# Patient Record
Sex: FEMALE
[De-identification: names, ages, dates, MRNs, and addresses within clinical notes are randomized per-mention and may not be internally consistent; named-entity substitution may affect disease eponyms.]

---

## 2017-06-27 ENCOUNTER — APPOINTMENT (OUTPATIENT)
Dept: UROGYNECOLOGY | Facility: CLINIC | Age: 47
End: 2017-06-27

## 2017-06-27 VITALS
BODY MASS INDEX: 19.51 KG/M2 | DIASTOLIC BLOOD PRESSURE: 70 MMHG | WEIGHT: 106 LBS | SYSTOLIC BLOOD PRESSURE: 122 MMHG | HEIGHT: 62 IN

## 2017-06-27 DIAGNOSIS — Z86.79 PERSONAL HISTORY OF OTHER DISEASES OF THE CIRCULATORY SYSTEM: ICD-10-CM

## 2017-06-27 DIAGNOSIS — Z83.49 FAMILY HISTORY OF OTHER ENDOCRINE, NUTRITIONAL AND METABOLIC DISEASES: ICD-10-CM

## 2017-06-27 DIAGNOSIS — Z78.9 OTHER SPECIFIED HEALTH STATUS: ICD-10-CM

## 2017-06-27 DIAGNOSIS — Z81.8 FAMILY HISTORY OF OTHER MENTAL AND BEHAVIORAL DISORDERS: ICD-10-CM

## 2017-06-27 DIAGNOSIS — Z82.5 FAMILY HISTORY OF ASTHMA AND OTHER CHRONIC LOWER RESPIRATORY DISEASES: ICD-10-CM

## 2017-06-27 DIAGNOSIS — Z83.3 FAMILY HISTORY OF DIABETES MELLITUS: ICD-10-CM

## 2017-07-31 ENCOUNTER — OUTPATIENT (OUTPATIENT)
Dept: OUTPATIENT SERVICES | Facility: HOSPITAL | Age: 47
LOS: 1 days | End: 2017-07-31
Payer: COMMERCIAL

## 2017-07-31 ENCOUNTER — APPOINTMENT (OUTPATIENT)
Dept: UROGYNECOLOGY | Facility: CLINIC | Age: 47
End: 2017-07-31
Payer: COMMERCIAL

## 2017-07-31 ENCOUNTER — RESULT REVIEW (OUTPATIENT)
Age: 47
End: 2017-07-31

## 2017-07-31 DIAGNOSIS — Z01.818 ENCOUNTER FOR OTHER PREPROCEDURAL EXAMINATION: ICD-10-CM

## 2017-07-31 DIAGNOSIS — R39.13 SPLITTING OF URINARY STREAM: ICD-10-CM

## 2017-07-31 DIAGNOSIS — R39.16 STRAINING TO VOID: ICD-10-CM

## 2017-07-31 LAB
BILIRUB UR QL STRIP: NORMAL
CLARITY UR: CLEAR
COLLECTION METHOD: NORMAL
GLUCOSE UR-MCNC: NORMAL
HCG UR QL: 0.2 EU/DL
HGB UR QL STRIP.AUTO: NORMAL
KETONES UR-MCNC: NORMAL
LEUKOCYTE ESTERASE UR QL STRIP: NORMAL
NITRITE UR QL STRIP: NORMAL
PH UR STRIP: 7
PROT UR STRIP-MCNC: NORMAL
SP GR UR STRIP: 1.01

## 2017-07-31 PROCEDURE — 51729 CYSTOMETROGRAM W/VP&UP: CPT | Mod: 26

## 2017-07-31 PROCEDURE — 51784 ANAL/URINARY MUSCLE STUDY: CPT | Mod: 26

## 2017-07-31 PROCEDURE — 51797 INTRAABDOMINAL PRESSURE TEST: CPT | Mod: 26

## 2017-08-01 DIAGNOSIS — R39.16 STRAINING TO VOID: ICD-10-CM

## 2017-08-01 DIAGNOSIS — R39.13 SPLITTING OF URINARY STREAM: ICD-10-CM

## 2017-08-01 DIAGNOSIS — N39.3 STRESS INCONTINENCE (FEMALE) (MALE): ICD-10-CM

## 2017-08-03 ENCOUNTER — APPOINTMENT (OUTPATIENT)
Dept: UROGYNECOLOGY | Facility: CLINIC | Age: 47
End: 2017-08-03

## 2017-08-10 ENCOUNTER — APPOINTMENT (OUTPATIENT)
Dept: UROGYNECOLOGY | Facility: CLINIC | Age: 47
End: 2017-08-10

## 2022-12-08 ENCOUNTER — NON-APPOINTMENT (OUTPATIENT)
Age: 52
End: 2022-12-08

## 2022-12-27 ENCOUNTER — APPOINTMENT (OUTPATIENT)
Dept: UROGYNECOLOGY | Facility: CLINIC | Age: 52
End: 2022-12-27

## 2022-12-27 VITALS
SYSTOLIC BLOOD PRESSURE: 123 MMHG | BODY MASS INDEX: 19.69 KG/M2 | HEIGHT: 62 IN | WEIGHT: 107 LBS | DIASTOLIC BLOOD PRESSURE: 79 MMHG

## 2022-12-27 DIAGNOSIS — Z83.511 FAMILY HISTORY OF GLAUCOMA: ICD-10-CM

## 2022-12-27 DIAGNOSIS — R35.0 FREQUENCY OF MICTURITION: ICD-10-CM

## 2022-12-27 DIAGNOSIS — Z82.49 FAMILY HISTORY OF ISCHEMIC HEART DISEASE AND OTHER DISEASES OF THE CIRCULATORY SYSTEM: ICD-10-CM

## 2022-12-27 DIAGNOSIS — Z83.42 FAMILY HISTORY OF FAMILIAL HYPERCHOLESTEROLEMIA: ICD-10-CM

## 2022-12-27 DIAGNOSIS — I34.1 NONRHEUMATIC MITRAL (VALVE) PROLAPSE: ICD-10-CM

## 2022-12-27 DIAGNOSIS — N39.3 STRESS INCONTINENCE (FEMALE) (MALE): ICD-10-CM

## 2022-12-27 DIAGNOSIS — R39.15 URGENCY OF URINATION: ICD-10-CM

## 2022-12-27 DIAGNOSIS — Z80.9 FAMILY HISTORY OF MALIGNANT NEOPLASM, UNSPECIFIED: ICD-10-CM

## 2022-12-27 LAB
BILIRUB UR QL STRIP: NORMAL
CLARITY UR: CLEAR
COLLECTION METHOD: NORMAL
GLUCOSE UR-MCNC: NORMAL
HCG UR QL: 0.2 EU/DL
HGB UR QL STRIP.AUTO: NORMAL
KETONES UR-MCNC: NORMAL
LEUKOCYTE ESTERASE UR QL STRIP: NORMAL
NITRITE UR QL STRIP: NORMAL
PH UR STRIP: 7.5
PROT UR STRIP-MCNC: NORMAL
SP GR UR STRIP: 1.02

## 2022-12-27 PROCEDURE — 51701 INSERT BLADDER CATHETER: CPT

## 2022-12-27 PROCEDURE — 99204 OFFICE O/P NEW MOD 45 MIN: CPT | Mod: 25

## 2022-12-27 RX ORDER — SOLIFENACIN SUCCINATE 5 MG/1
5 TABLET ORAL
Qty: 30 | Refills: 5 | Status: ACTIVE | COMMUNITY
Start: 2022-12-27 | End: 1900-01-01

## 2022-12-27 NOTE — HISTORY OF PRESENT ILLNESS
[Unable To Restrain Bowel Movement] : mild [] : years ago [Feelings Of Urinary Urgency] : severe [Hematuria] : severe [Urinary Tract Infection] : severe [Constipation Obstructed Defecation] : no [de-identified] : denies bother, reports this is occasional  [de-identified] : 1-2 [FreeTextEntry6] : underwent GI evaluattion that was normal as per patient [FreeTextEntry1] : \par Underwent urodynamic testing in 2017 which I reviewed. URD revealed RAFAEL. PVR 0 cc, however she was unable to void with catheters in place. \par \par daily fluid intake: 2 c coffee, 8-12 oz water

## 2022-12-27 NOTE — PROCEDURE
[Fluid Management] : fluid management [Bladder Training] : bladder training [FreeTextEntry1] : Sterile straight catheterization was performed to measure a postvoid residual volume which was 20 cc

## 2022-12-27 NOTE — REASON FOR VISIT
[Questionnaire Received] : Patient questionnaire received [Intake Form Reviewed] : Patient intake form with past medical history, surgical history, family history and social history reviewed today [Urine Frequency] : urine frequency [Urinary Incontinence] : urinary incontinence

## 2022-12-27 NOTE — DISCUSSION/SUMMARY
[FreeTextEntry1] : \par 1. Frequency, urgency: We discussed possible etiologies of her symptoms including overactive bladder. I recommend she start behavioral modifications and bladder training. Written instructions on how to perform bladder training were provided.  She will try this for 4-6 weeks. We reviewed medications for overactive bladder.  If she has no significant improvement in her symptoms she will try adding an anticholinergic medication. Risks and side effects reviewed extensively. She will RTO in 10-12 weeks for follow up or sooner if issues arise.\par  \par 2. RAFAEL: She denies bother, will continue to observe. If she develops bothersome symptoms, will notify me and proceed with treatment at that time. \par \par The following treatment plan was designed for this patient and provided to her in written form and reviewed extensively. Patient was given a copy to take home: \par For Urinary Symptoms (frequency, urgency, difficulty holding urine):\par **Total fluids: 34-50 oz (1-1.5 Liters) per day\par   -Ex. 8 oz coffee or tea (NOT BOTH!), 2-3 bottles of water (Each bottle is 16.9 oz). No flavoring added. No seltzer or Pelligrino!\par  -Drink slowly throughout day, no binge drinking (each bottle of water should take you hours, not minutes)\par **Avoid: 2nd cup coffee or tea (even if decaf), alcohol, carbonation (soda, seltzer), spicy foods, citrus, artificial sweeteners, chocolate\par **Stop eating and drinking 2-3 hours before bedtime (sips ok)\par \par -Try the above fluid changes and bladder training for 4 weeks. If symptoms still remain bothersome, add Vesicare (solifenacin) 5 mg daily. You must continue the fluid changes while on medication. Medication may take 3-4 weeks to start working. Common side effects include: dry mouth, dry eyes, constipation. If side effects try to manage first:\par Dry eyes: lubricating eye drops\par Dry mouth: biotene mouth wash\par Constipation: Miralax\par \par If not covered by insurance, call insurance company and get list of overactive bladder medications that are covered. Call my office with list (Mon-Fri) and we will change medication\par

## 2022-12-27 NOTE — PHYSICAL EXAM
[Chaperone Present] : A chaperone was present in the examining room during all aspects of the physical examination [Labia Majora] : were normal [Normal Appearance] : general appearance was normal [Normal] : no abnormalities [Exam Deferred] : was deferred [FreeTextEntry1] : General: Well, appearing. Alert and orientated. No acute distress\par HEENT: Normocephalic, atraumatic and extraocular muscles appear to be intact \par Neck: Full range of motion, no obvious lymphadenopathy, deformities, or masses noted \par Respiratory: Speaking in full sentences comfortably, normal work of breathing and no cough during visit\par Musculoskeletal: active full range of motion in extremities \par Extremities: No upper extremity edema noted\par Skin: no obvious rash or skin lesions\par Neuro: Orientated X 3, speech is fluent, normal rate\par Psych: Normal mood and affect \par   [Tenderness] : ~T no ~M abdominal tenderness observed [Distended] : not distended [de-identified] : no prolapse

## 2022-12-28 PROBLEM — Z80.9 FAMILY HISTORY OF MALIGNANT NEOPLASM: Status: ACTIVE | Noted: 2022-12-28

## 2022-12-28 PROBLEM — Z83.42 FAMILY HISTORY OF HYPERCHOLESTEROLEMIA: Status: ACTIVE | Noted: 2022-12-28

## 2022-12-28 PROBLEM — I34.1 MITRAL VALVE PROLAPSE: Status: ACTIVE | Noted: 2022-12-28

## 2022-12-28 PROBLEM — Z82.49 FAMILY HISTORY OF HYPERTENSION: Status: ACTIVE | Noted: 2022-12-28

## 2022-12-28 PROBLEM — Z82.49 FAMILY HISTORY OF HEART MURMUR: Status: ACTIVE | Noted: 2022-12-28

## 2022-12-28 PROBLEM — Z83.511 FAMILY HISTORY OF GLAUCOMA: Status: ACTIVE | Noted: 2022-12-28

## 2022-12-28 RX ORDER — AMOXICILLIN 500 MG/1
500 TABLET, FILM COATED ORAL
Qty: 30 | Refills: 0 | Status: DISCONTINUED | COMMUNITY
Start: 2022-10-11

## 2022-12-28 RX ORDER — GABAPENTIN 300 MG/1
300 CAPSULE ORAL
Qty: 30 | Refills: 0 | Status: DISCONTINUED | COMMUNITY
Start: 2022-08-22

## 2022-12-28 RX ORDER — GABAPENTIN 300 MG/1
300 CAPSULE ORAL
Refills: 0 | Status: ACTIVE | COMMUNITY

## 2022-12-29 LAB — BACTERIA UR CULT: NORMAL

## 2023-01-06 DIAGNOSIS — R31.29 OTHER MICROSCOPIC HEMATURIA: ICD-10-CM

## 2023-01-12 LAB
APPEARANCE: CLEAR
BACTERIA: NEGATIVE
BILIRUBIN URINE: NEGATIVE
BLOOD URINE: NEGATIVE
CALCIUM OXALATE CRYSTALS: NEGATIVE
COLOR: NORMAL
GLUCOSE QUALITATIVE U: NEGATIVE
GRANULAR CASTS: 0 /LPF
HYALINE CASTS: 0 /LPF
KETONES URINE: NEGATIVE
LEUKOCYTE ESTERASE URINE: NEGATIVE
MICROSCOPIC-UA: NORMAL
NITRITE URINE: NEGATIVE
PH URINE: 7.5
PROTEIN URINE: NEGATIVE
RED BLOOD CELLS URINE: 3 /HPF
SPECIFIC GRAVITY URINE: 1.02
SQUAMOUS EPITHELIAL CELLS: 2 /HPF
TRIPLE PHOSPHATE CRYSTALS: NEGATIVE
URIC ACID CRYSTALS: NEGATIVE
UROBILINOGEN URINE: NORMAL
WHITE BLOOD CELLS URINE: 2 /HPF

## 2023-01-13 ENCOUNTER — OUTPATIENT (OUTPATIENT)
Dept: OUTPATIENT SERVICES | Facility: HOSPITAL | Age: 53
LOS: 1 days | End: 2023-01-13
Payer: COMMERCIAL

## 2023-01-13 ENCOUNTER — APPOINTMENT (OUTPATIENT)
Dept: ULTRASOUND IMAGING | Facility: CLINIC | Age: 53
End: 2023-01-13

## 2023-01-13 ENCOUNTER — RESULT REVIEW (OUTPATIENT)
Age: 53
End: 2023-01-13

## 2023-01-13 DIAGNOSIS — R31.29 OTHER MICROSCOPIC HEMATURIA: ICD-10-CM

## 2023-01-13 PROCEDURE — 76775 US EXAM ABDO BACK WALL LIM: CPT | Mod: 26

## 2023-01-13 PROCEDURE — 76775 US EXAM ABDO BACK WALL LIM: CPT

## 2023-02-07 ENCOUNTER — APPOINTMENT (OUTPATIENT)
Dept: UROGYNECOLOGY | Facility: CLINIC | Age: 53
End: 2023-02-07

## 2023-03-29 ENCOUNTER — APPOINTMENT (OUTPATIENT)
Dept: UROGYNECOLOGY | Facility: CLINIC | Age: 53
End: 2023-03-29